# Patient Record
Sex: FEMALE
[De-identification: names, ages, dates, MRNs, and addresses within clinical notes are randomized per-mention and may not be internally consistent; named-entity substitution may affect disease eponyms.]

---

## 2021-12-15 ENCOUNTER — HOSPITAL ENCOUNTER (INPATIENT)
Dept: HOSPITAL 93 - O/R | Age: 76
LOS: 15 days | Discharge: HOME | DRG: 329 | End: 2021-12-30
Attending: SURGERY | Admitting: SURGERY
Payer: COMMERCIAL

## 2021-12-15 VITALS — BODY MASS INDEX: 23.04 KG/M2 | HEIGHT: 63 IN | WEIGHT: 130 LBS

## 2021-12-15 DIAGNOSIS — E11.9: ICD-10-CM

## 2021-12-15 DIAGNOSIS — Z79.4: ICD-10-CM

## 2021-12-15 DIAGNOSIS — C18.5: ICD-10-CM

## 2021-12-15 DIAGNOSIS — I63.81: ICD-10-CM

## 2021-12-15 DIAGNOSIS — E78.5: ICD-10-CM

## 2021-12-15 DIAGNOSIS — R59.0: ICD-10-CM

## 2021-12-15 DIAGNOSIS — I50.22: ICD-10-CM

## 2021-12-15 DIAGNOSIS — I48.20: ICD-10-CM

## 2021-12-15 DIAGNOSIS — Z79.01: ICD-10-CM

## 2021-12-15 DIAGNOSIS — C18.2: Primary | ICD-10-CM

## 2021-12-15 DIAGNOSIS — Z95.0: ICD-10-CM

## 2021-12-21 PROCEDURE — 0DTN4ZZ RESECTION OF SIGMOID COLON, PERCUTANEOUS ENDOSCOPIC APPROACH: ICD-10-PCS | Performed by: SURGERY

## 2021-12-21 PROCEDURE — 0DBP4ZZ EXCISION OF RECTUM, PERCUTANEOUS ENDOSCOPIC APPROACH: ICD-10-PCS | Performed by: SURGERY

## 2021-12-21 PROCEDURE — 0DBK4ZZ EXCISION OF ASCENDING COLON, PERCUTANEOUS ENDOSCOPIC APPROACH: ICD-10-PCS | Performed by: SURGERY

## 2021-12-21 PROCEDURE — 0DBB4ZZ EXCISION OF ILEUM, PERCUTANEOUS ENDOSCOPIC APPROACH: ICD-10-PCS | Performed by: SURGERY

## 2021-12-21 PROCEDURE — 07BB4ZX EXCISION OF MESENTERIC LYMPHATIC, PERCUTANEOUS ENDOSCOPIC APPROACH, DIAGNOSTIC: ICD-10-PCS | Performed by: SURGERY

## 2021-12-21 PROCEDURE — 0DBU4ZZ EXCISION OF OMENTUM, PERCUTANEOUS ENDOSCOPIC APPROACH: ICD-10-PCS | Performed by: SURGERY

## 2021-12-23 PROCEDURE — B020ZZZ COMPUTERIZED TOMOGRAPHY (CT SCAN) OF BRAIN: ICD-10-PCS | Performed by: INTERNAL MEDICINE

## 2021-12-26 PROCEDURE — B348ZZZ ULTRASONOGRAPHY OF BILATERAL INTERNAL CAROTID ARTERIES: ICD-10-PCS | Performed by: SPECIALIST

## 2021-12-26 PROCEDURE — B24BYZZ ULTRASONOGRAPHY OF HEART WITH AORTA USING OTHER CONTRAST: ICD-10-PCS | Performed by: SPECIALIST

## 2021-12-26 PROCEDURE — B345ZZZ ULTRASONOGRAPHY OF BILATERAL COMMON CAROTID ARTERIES: ICD-10-PCS | Performed by: SPECIALIST

## 2021-12-28 PROCEDURE — B020ZZZ COMPUTERIZED TOMOGRAPHY (CT SCAN) OF BRAIN: ICD-10-PCS | Performed by: INTERNAL MEDICINE

## 2021-12-31 ENCOUNTER — HOSPITAL ENCOUNTER (INPATIENT)
Dept: HOSPITAL 93 - ER | Age: 76
LOS: 27 days | DRG: 637 | End: 2022-01-27
Attending: INTERNAL MEDICINE | Admitting: INTERNAL MEDICINE
Payer: COMMERCIAL

## 2021-12-31 VITALS — BODY MASS INDEX: 23.04 KG/M2 | WEIGHT: 130 LBS | HEIGHT: 63 IN

## 2021-12-31 DIAGNOSIS — E11.40: ICD-10-CM

## 2021-12-31 DIAGNOSIS — J81.1: ICD-10-CM

## 2021-12-31 DIAGNOSIS — Z79.4: ICD-10-CM

## 2021-12-31 DIAGNOSIS — I13.0: ICD-10-CM

## 2021-12-31 DIAGNOSIS — R65.21: ICD-10-CM

## 2021-12-31 DIAGNOSIS — E87.4: ICD-10-CM

## 2021-12-31 DIAGNOSIS — R13.19: ICD-10-CM

## 2021-12-31 DIAGNOSIS — D69.6: ICD-10-CM

## 2021-12-31 DIAGNOSIS — E86.0: ICD-10-CM

## 2021-12-31 DIAGNOSIS — E87.0: ICD-10-CM

## 2021-12-31 DIAGNOSIS — E87.6: ICD-10-CM

## 2021-12-31 DIAGNOSIS — N17.8: ICD-10-CM

## 2021-12-31 DIAGNOSIS — D64.9: ICD-10-CM

## 2021-12-31 DIAGNOSIS — I21.4: ICD-10-CM

## 2021-12-31 DIAGNOSIS — I50.23: ICD-10-CM
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
## 2021-12-31 DIAGNOSIS — E78.49: ICD-10-CM

## 2021-12-31 DIAGNOSIS — I63.89: ICD-10-CM

## 2021-12-31 DIAGNOSIS — Z95.0: ICD-10-CM

## 2021-12-31 DIAGNOSIS — N39.0: ICD-10-CM

## 2021-12-31 DIAGNOSIS — B96.89: ICD-10-CM

## 2021-12-31 DIAGNOSIS — J96.00: ICD-10-CM

## 2021-12-31 DIAGNOSIS — J90: ICD-10-CM

## 2021-12-31 DIAGNOSIS — I48.20: ICD-10-CM

## 2021-12-31 DIAGNOSIS — Z20.822: ICD-10-CM

## 2021-12-31 DIAGNOSIS — J18.8: ICD-10-CM

## 2021-12-31 DIAGNOSIS — N18.30: ICD-10-CM

## 2021-12-31 DIAGNOSIS — I25.10: ICD-10-CM

## 2021-12-31 DIAGNOSIS — M79.661: ICD-10-CM

## 2021-12-31 DIAGNOSIS — G92.8: ICD-10-CM

## 2021-12-31 DIAGNOSIS — E11.11: Primary | ICD-10-CM

## 2021-12-31 NOTE — NUR
SE RECIBE PTE ALERTA EN AMBULANCIA EN COMPANIA DE FAMILIAR. FAMILIAR INDICA QUE
PTE FUE OPERADA POR  Y MABLE LA OPERACION SUFRIO UN CVA. INDICA QUE EL
BUCK DE HARI FUE YESSI DE DARCI Y HOY NO QUIERE COMER, NO PUEDE TRAGAR Y NO HABLA.
SE REALIZA EKG Y SE PRESENTA A .

## 2021-12-31 NOTE — NUR
SE ORIENTA A FAMILIAR Y PACIENTE SOBRE TRATAMIENTO ORDENADO POR DRA. THOMAS, LA
MISMA VERBALIZA ENTENDER. RN VALDES COLECTAN MUESTRAS ORDENADAS HACIENDO USO DE
MEDIDAS ASEPTICAS CORRESPONDIENTES, COLOCA VENOPUNCION PATENTE STEPHANIE DE ERITEMA
Y EDEMA Y ADMINISTRA MEDICAMENTOS CORRESPONDIENTES. PENDIENTE RESULTADOS DE
LABORATORIOS Y XRAYS.

## 2022-01-01 PROCEDURE — 4A12X4Z MONITORING OF CARDIAC ELECTRICAL ACTIVITY, EXTERNAL APPROACH: ICD-10-PCS | Performed by: INTERNAL MEDICINE

## 2022-01-02 PROCEDURE — 4A033R1 MEASUREMENT OF ARTERIAL SATURATION, PERIPHERAL, PERCUTANEOUS APPROACH: ICD-10-PCS | Performed by: INTERNAL MEDICINE

## 2022-01-02 PROCEDURE — 30243N1 TRANSFUSION OF NONAUTOLOGOUS RED BLOOD CELLS INTO CENTRAL VEIN, PERCUTANEOUS APPROACH: ICD-10-PCS | Performed by: INTERNAL MEDICINE

## 2022-01-02 PROCEDURE — BW28ZZZ COMPUTERIZED TOMOGRAPHY (CT SCAN) OF HEAD: ICD-10-PCS | Performed by: RADIOLOGY

## 2022-01-02 PROCEDURE — 3E0F7SF INTRODUCTION OF OTHER GAS INTO RESPIRATORY TRACT, VIA NATURAL OR ARTIFICIAL OPENING: ICD-10-PCS | Performed by: INTERNAL MEDICINE

## 2022-01-02 PROCEDURE — 02HV33Z INSERTION OF INFUSION DEVICE INTO SUPERIOR VENA CAVA, PERCUTANEOUS APPROACH: ICD-10-PCS | Performed by: INTERNAL MEDICINE

## 2022-01-03 PROCEDURE — BW2110Z COMPUTERIZED TOMOGRAPHY (CT SCAN) OF ABDOMEN AND PELVIS USING LOW OSMOLAR CONTRAST, UNENHANCED AND ENHANCED: ICD-10-PCS | Performed by: RADIOLOGY

## 2022-01-04 PROCEDURE — BW28ZZZ COMPUTERIZED TOMOGRAPHY (CT SCAN) OF HEAD: ICD-10-PCS | Performed by: RADIOLOGY

## 2022-01-12 PROCEDURE — B54BZZZ ULTRASONOGRAPHY OF RIGHT LOWER EXTREMITY VEINS: ICD-10-PCS | Performed by: NUCLEAR MEDICINE

## 2022-01-14 PROCEDURE — BW25ZZZ COMPUTERIZED TOMOGRAPHY (CT SCAN) OF CHEST, ABDOMEN AND PELVIS: ICD-10-PCS | Performed by: RADIOLOGY

## 2022-01-14 PROCEDURE — 3E0F7GC INTRODUCTION OF OTHER THERAPEUTIC SUBSTANCE INTO RESPIRATORY TRACT, VIA NATURAL OR ARTIFICIAL OPENING: ICD-10-PCS | Performed by: INTERNAL MEDICINE

## 2022-01-15 PROCEDURE — 5A09457 ASSISTANCE WITH RESPIRATORY VENTILATION, 24-96 CONSECUTIVE HOURS, CONTINUOUS POSITIVE AIRWAY PRESSURE: ICD-10-PCS | Performed by: INTERNAL MEDICINE

## 2022-01-18 PROCEDURE — 0BH17EZ INSERTION OF ENDOTRACHEAL AIRWAY INTO TRACHEA, VIA NATURAL OR ARTIFICIAL OPENING: ICD-10-PCS | Performed by: GENERAL PRACTICE

## 2022-01-18 PROCEDURE — 5A1955Z RESPIRATORY VENTILATION, GREATER THAN 96 CONSECUTIVE HOURS: ICD-10-PCS | Performed by: GENERAL PRACTICE
